# Patient Record
Sex: FEMALE | Race: WHITE | URBAN - METROPOLITAN AREA
[De-identification: names, ages, dates, MRNs, and addresses within clinical notes are randomized per-mention and may not be internally consistent; named-entity substitution may affect disease eponyms.]

---

## 2022-03-19 PROBLEM — I87.2 VENOUS INSUFFICIENCY OF BOTH LOWER EXTREMITIES: Status: ACTIVE | Noted: 2022-01-11

## 2022-03-20 PROBLEM — I83.891 VARICOSE VEINS OF RIGHT LEG WITH EDEMA: Status: ACTIVE | Noted: 2018-06-19

## 2022-03-20 PROBLEM — R11.2 INTRACTABLE NAUSEA AND VOMITING: Status: ACTIVE | Noted: 2017-12-08

## 2023-11-06 ENCOUNTER — OFFICE VISIT (OUTPATIENT)
Age: 53
End: 2023-11-06
Payer: COMMERCIAL

## 2023-11-06 VITALS
WEIGHT: 199.8 LBS | HEIGHT: 63 IN | BODY MASS INDEX: 35.4 KG/M2 | TEMPERATURE: 98 F | RESPIRATION RATE: 16 BRPM | HEART RATE: 93 BPM | OXYGEN SATURATION: 95 % | SYSTOLIC BLOOD PRESSURE: 122 MMHG | DIASTOLIC BLOOD PRESSURE: 80 MMHG

## 2023-11-06 DIAGNOSIS — M26.609 TMJ (TEMPOROMANDIBULAR JOINT SYNDROME): ICD-10-CM

## 2023-11-06 DIAGNOSIS — G44.86 CERVICOGENIC HEADACHE: ICD-10-CM

## 2023-11-06 DIAGNOSIS — G43.109 MIGRAINE WITH AURA AND WITHOUT STATUS MIGRAINOSUS, NOT INTRACTABLE: Primary | ICD-10-CM

## 2023-11-06 DIAGNOSIS — M47.22 CERVICAL RADICULOPATHY DUE TO DEGENERATIVE JOINT DISEASE OF SPINE: ICD-10-CM

## 2023-11-06 PROCEDURE — 99214 OFFICE O/P EST MOD 30 MIN: CPT | Performed by: PSYCHIATRY & NEUROLOGY

## 2023-11-06 RX ORDER — METOPROLOL SUCCINATE 25 MG/1
25 TABLET, EXTENDED RELEASE ORAL DAILY
COMMUNITY
Start: 2023-06-09

## 2023-11-06 ASSESSMENT — PATIENT HEALTH QUESTIONNAIRE - PHQ9
SUM OF ALL RESPONSES TO PHQ QUESTIONS 1-9: 0
1. LITTLE INTEREST OR PLEASURE IN DOING THINGS: 0
SUM OF ALL RESPONSES TO PHQ9 QUESTIONS 1 & 2: 0
2. FEELING DOWN, DEPRESSED OR HOPELESS: 0
SUM OF ALL RESPONSES TO PHQ QUESTIONS 1-9: 0

## 2023-11-07 NOTE — PROGRESS NOTES
Consult  REFERRED BY:  FLORENCE Rai    CHIEF COMPLAINT: Persistent headaches 3 to 4 days every week in almost every other day      Subjective: Anand López is a 47 y. o.right-handed  female seen today at the request of Dr. Magalie Pappas for evaluation of new problem of progressive headaches despite being on and restarted on amitriptyline 100 mg at night, and still cannot sleep and has insomnia, and headaches and to be more in the craniocervical junction area and radiate to the bifrontal region, left more than right and associated with some pain in the temporal areas left greater than right, with these headaches occurring every other day and transforming into migraine about 2 times a month which she has to take her Zofran and migraine medications. She is on the Nurtec for episodic treatment, but we will change that to preventive treatment and see how she does, and then consider injectable form for Mara Herrera if she fails that. She has tried triptans in the past including Maxalt and Imitrex and has failed to triptans and has failed Topamax as a preventive medicine at [facility] gave her last time which made her sick and she could not tolerate the medication. She is already on amitriptyline and it helps some, but she still has frequent headaches despite the 100 mg dose. 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 The headaches can last for days at a time now. She has no new focal weakness, no numbness, no visual changes, no increase in headaches, no gait problems, no cognitive issues, no fever, no meningismus, no history of recent head trauma or falls and no major increase in neck pain. She has had some chest pain and being evaluated by cardiology but the workup has been negative so far. She also has some venous insufficiency in her legs and seeing a vascular surgeon.   No other new medical

## 2024-05-03 DIAGNOSIS — G43.109 MIGRAINE WITH AURA, NOT INTRACTABLE, WITHOUT STATUS MIGRAINOSUS: ICD-10-CM

## 2024-05-03 DIAGNOSIS — M47.22 OTHER SPONDYLOSIS WITH RADICULOPATHY, CERVICAL REGION: ICD-10-CM

## 2024-05-03 RX ORDER — AMITRIPTYLINE HYDROCHLORIDE 50 MG/1
TABLET, FILM COATED ORAL
Qty: 180 TABLET | Refills: 3 | Status: SHIPPED | OUTPATIENT
Start: 2024-05-03

## 2024-05-13 ENCOUNTER — TELEPHONE (OUTPATIENT)
Age: 54
End: 2024-05-13

## 2024-05-13 NOTE — TELEPHONE ENCOUNTER
Patient states her insurance needs a PA for [facility] in order to get more refills. Please begin this process so the patient can get her refills. She is asking for an update when this is complete. Please call her at . Thank you.     [employer] Auth Department  Prior Auth Phone Number: [phone]  Prior Auth Fax Number: [phone]

## 2024-05-14 ENCOUNTER — TELEPHONE (OUTPATIENT)
Age: 54
End: 2024-05-14

## 2024-05-16 ENCOUNTER — TELEPHONE (OUTPATIENT)
Age: 54
End: 2024-05-16

## 2024-05-16 NOTE — TELEPHONE ENCOUNTER
Nurtec 16 per 30 for prevention  Sent via CMM to Roberto   CMM Key  [id number]     PA Case: [id number], Status: Approved, Coverage Starts on: 5/16/2024 12:00:00 AM, Coverage Ends on: 5/16/2025 12:00:00 AM.    Letter uploaded to Media.     Rt Fax sent to  [facility]  [phone]

## 2024-06-12 NOTE — TELEPHONE ENCOUNTER
Nurtec was approved on 5/16/24 - closing out this encounter.     PA Case: [id number], Status: Approved, Coverage Starts on: 5/16/2024 12:00:00 AM, Coverage Ends on: 5/16/2025

## 2024-11-07 ENCOUNTER — OFFICE VISIT (OUTPATIENT)
Age: 54
End: 2024-11-07
Payer: COMMERCIAL

## 2024-11-07 VITALS
HEIGHT: 63 IN | RESPIRATION RATE: 19 BRPM | WEIGHT: 200.8 LBS | HEART RATE: 83 BPM | TEMPERATURE: 97.5 F | DIASTOLIC BLOOD PRESSURE: 84 MMHG | SYSTOLIC BLOOD PRESSURE: 130 MMHG | BODY MASS INDEX: 35.58 KG/M2 | OXYGEN SATURATION: 98 %

## 2024-11-07 DIAGNOSIS — M26.609 TMJ (TEMPOROMANDIBULAR JOINT SYNDROME): ICD-10-CM

## 2024-11-07 DIAGNOSIS — G44.86 CERVICOGENIC HEADACHE: ICD-10-CM

## 2024-11-07 DIAGNOSIS — G43.109 MIGRAINE WITH AURA AND WITHOUT STATUS MIGRAINOSUS, NOT INTRACTABLE: ICD-10-CM

## 2024-11-07 PROCEDURE — 99213 OFFICE O/P EST LOW 20 MIN: CPT | Performed by: PSYCHIATRY & NEUROLOGY

## 2024-11-07 RX ORDER — LEVOTHYROXINE SODIUM 50 UG/1
50 TABLET ORAL DAILY
COMMUNITY
Start: 2024-11-01

## 2024-11-07 RX ORDER — HYDROCHLOROTHIAZIDE 12.5 MG/1
12.5 TABLET ORAL DAILY
COMMUNITY
Start: 2024-10-29

## 2024-11-07 ASSESSMENT — PATIENT HEALTH QUESTIONNAIRE - PHQ9
2. FEELING DOWN, DEPRESSED OR HOPELESS: NOT AT ALL
SUM OF ALL RESPONSES TO PHQ QUESTIONS 1-9: 0
SUM OF ALL RESPONSES TO PHQ QUESTIONS 1-9: 0
SUM OF ALL RESPONSES TO PHQ9 QUESTIONS 1 & 2: 0
1. LITTLE INTEREST OR PLEASURE IN DOING THINGS: NOT AT ALL
SUM OF ALL RESPONSES TO PHQ QUESTIONS 1-9: 0
SUM OF ALL RESPONSES TO PHQ QUESTIONS 1-9: 0

## 2024-11-08 NOTE — PROGRESS NOTES
medications approved, but with her insurance Nurtec was the preferred drug she is on that now, taking 8/month is still not controlling her headaches, so we will advance her to every other day preventive medications because she has failed episodic medications.  Patient is agreeable to plans as above the risk and benefits of that all explained to the patient again in addition.  With her insurance I do not think we are going to be able to get episodic and preventive treatment both but if she fails this she could try one of the injections once a month and Nurtec as needed.  She may be a candidate for Ubrelvy if she fails all this.  Qulipta as a preventive agent is the best drug, but right now he can get that..    Also because her headaches are more cervicogenic now, the arthritis in her neck may have gotten worse so we will repeat her cervical spine x-rays with flexion-extension views.  If she has more arthritis she may need an MRI scan.  She just had an MRI scan of the brain done in September that was stable with minimal microvascular disease thought to be secondary to her migraine headaches.  Prescriptions all sent in for patient today and renewed for her in detail.  Patient with history of cervical radiculopathy into the left arm, treat with exercises and anti-inflammatories and consider physical therapy if it returns  Patient had neck x-ray and do show moderate arthritis and spondylolisthesis at C4-5, and these were reviewed personally by myself on the PACS system and I agree with the interpretation sure she has moderate disease at C4-5 which may be causing a see 5-6 radiculopathy in her arm on the left side today  Patient has fairly classic migraine history with the family history of migraines.  She has a normal exam and apparently has had normal imaging of the brain in the past, and we will try to obtain those records from her PCP  She is encouraged to take the medication as soon as she has the onset of

## 2025-04-22 DIAGNOSIS — G43.109 MIGRAINE WITH AURA, NOT INTRACTABLE, WITHOUT STATUS MIGRAINOSUS: ICD-10-CM

## 2025-04-22 DIAGNOSIS — M47.22 OTHER SPONDYLOSIS WITH RADICULOPATHY, CERVICAL REGION: ICD-10-CM

## 2025-04-22 RX ORDER — AMITRIPTYLINE HYDROCHLORIDE 50 MG/1
100 TABLET ORAL NIGHTLY PRN
Qty: 180 TABLET | Refills: 3 | Status: SHIPPED | OUTPATIENT
Start: 2025-04-22

## 2025-05-13 ENCOUNTER — TELEPHONE (OUTPATIENT)
Age: 55
End: 2025-05-13

## 2025-05-13 DIAGNOSIS — G44.86 CERVICOGENIC HEADACHE: ICD-10-CM

## 2025-05-13 DIAGNOSIS — M26.609 TMJ (TEMPOROMANDIBULAR JOINT SYNDROME): ICD-10-CM

## 2025-05-13 DIAGNOSIS — G43.109 MIGRAINE WITH AURA AND WITHOUT STATUS MIGRAINOSUS, NOT INTRACTABLE: ICD-10-CM
